# Patient Record
Sex: FEMALE | Race: WHITE | NOT HISPANIC OR LATINO | URBAN - METROPOLITAN AREA
[De-identification: names, ages, dates, MRNs, and addresses within clinical notes are randomized per-mention and may not be internally consistent; named-entity substitution may affect disease eponyms.]

---

## 2017-01-02 ENCOUNTER — HOSPITAL ENCOUNTER (OUTPATIENT)
Dept: PHYSICAL THERAPY | Facility: HOSPITAL | Age: 62
Setting detail: THERAPIES SERIES
Discharge: HOME | End: 2017-01-02

## 2017-01-02 DIAGNOSIS — Z47.89 ORTHOPEDIC AFTERCARE: Primary | ICD-10-CM

## 2017-01-02 DIAGNOSIS — M25.561 ACUTE PAIN OF RIGHT KNEE: ICD-10-CM

## 2017-01-02 DIAGNOSIS — Z96.651 HISTORY OF TOTAL KNEE REPLACEMENT, RIGHT: ICD-10-CM

## 2017-01-02 PROCEDURE — 97161 PT EVAL LOW COMPLEX 20 MIN: CPT

## 2017-01-02 PROCEDURE — 97110 THERAPEUTIC EXERCISES: CPT

## 2017-01-02 PROCEDURE — G8979 MOBILITY GOAL STATUS: HCPCS

## 2017-01-02 PROCEDURE — G8978 MOBILITY CURRENT STATUS: HCPCS

## 2017-01-02 NOTE — PROGRESS NOTES
Outpatient Physical Therapy Ortho Initial Evaluation  [facility]     Patient Name: Lisette Larose  : 1955  MRN: [id number]  Today's Date: 2017      Visit Date: 2017    There is no problem list on file for this patient.       History reviewed. No pertinent past medical history.     History reviewed. No pertinent past surgical history.    Visit Dx:     ICD-10-CM ICD-9-CM   1. Orthopedic aftercare Z47.89 V54.9   2. History of total knee replacement, right Z96.651 V43.65   3. Acute pain of right knee M25.561 719.46             Patient History       17 0900          History    Chief Complaint Pain  -LS      Type of Pain Knee pain  -LS      Date Current Problem(s) Began 16  -LS      Brief Description of Current Complaint Pt is s/p TKR 16. She is currently living with her daughter in [place] who has 2 small steps to enter home for next 4 weeks during therapy. She reports long hx of OA and B knee pain R > L. She also has hx of L RTC repair 2 years ago. She reports aching pain superior to medial femoral condyle and achiness throughout R shin and femur increased with ambulation or standing.   -LS      Previous treatment for THIS PROBLEM Surgery  -LS      Onset Date- PT 17  -LS      Surgery Date: 16  -LS      Patient/Caregiver Goals Relieve pain;Return to prior level of function;Improve mobility;Improve strength  -LS      Occupation/sports/leisure activities Pt does not work, enjoys shopping, walking, going out with friends.  -LS      Patient seeing anyone else for problem(s)? Yes  -LS      How has patient tried to help current problem? rest, exercise, medication  -LS      Surgery Date 1 16  -LS      Pain     Pain Location Knee  -LS      Pain at Present 4  -LS      Pain at Best 2  -LS      Pain at Worst 9  -LS      Pain Frequency Constant/continuous  -LS      Pain Description Aching  -LS      What Performance Factors Make the Current Problem(s) WORSE? standing, walking   -LS      What Performance Factors Make the Current Problem(s) BETTER? rest, ice, medication  -LS      Pain Comments I have pain mostly at my medial knee and it goes into my shin and up into my leg.  -LS      Tolerance Time- Standing 15 minutes  -LS      Tolerance Time- Sitting no issue  -LS      Tolerance Time- Walking 15 minutes  -LS      Tolerance Time- Lying disturbed, has to get up and move around  -LS      Is your sleep disturbed? Yes  -LS      What position do you sleep in? Left sidelying  -LS      Difficulties at work? pt does not work  -LS      Difficulties with ADL's? yes, unable to stand and walk for long periods of time to clean/cook  -LS      Difficulties with recreational activities? yes, pt has difficulty ambulating within mall or grocery store.  -LS      Fall Risk Assessment    Any falls in the past year: No  -LS      Services    Prior Rehab/Home Health Experiences Yes  -LS      When was the prior experience with Rehab/Home Health December 2016  -LS      Where was the prior experience with Rehab/Home Health [facility]  -LS      Are you currently receiving Home Health services No  -LS      Daily Activities    Primary Language English  -LS      Pt Participated in POC and Goals Yes  -LS      Safety    Are you being hurt, hit, or frightened by anyone at home or in your life? No  -LS      Are you being neglected by a caregiver No  -LS        User Key  (r) = Recorded By, (t) = Taken By, (c) = Cosigned By    Initials Name Provider Type    JOSELYN Taylor, PT Physical Therapist                PT Ortho       01/02/17 1300    Subjective Comments    Subjective Comments I have pain mostly on the inside of my knee, but that was there prior to sx.  -LS    Subjective Pain    Able to rate subjective pain? yes  -LS    Pre-Treatment Pain Level 4  -LS    Post-Treatment Pain Level 4  -LS    Subjective Pain Comment It feels more loose after I exercies.  -LS    Posture/Observations    Posture/Observations Comments Noted  R knee incision, closed, well- healing, no evidence of infection; minimally elevated at proximal 1-2 in. of incision, minimal scar tissue noted; forward head, rounded shoulders, decreased lumbar lordosis  -LS    Sensation    Additional Comments Pt reports numbness lateral to proximal 50% of incision. Hypersensitivity along incision.  -LS    Myotomal Screen- Lower Quarter Clearing    Hip flexion (L2) Left:;4+ (Good +);Right:;4- (Good -)  -LS    Knee extension (L3) Left:;5 (Normal);Right:;4 (Good)  -LS    Ankle DF (L4) Bilateral:;5 (Normal)  -LS    Ankle PF (S1) Bilateral:;5 (Normal)  -LS    Knee flexion (S2) Left:;5 (Normal);Right:;4+ (Good +)  -LS    Left Knee    Extension/Flexion AROM Deficit 0-120 degrees in supine  -LS    Right Knee    Extension/Flexion AROM Deficit 5-92 degrees in supine  -LS    Extension/Flexion PROM Deficit 5-95 degrees in supine  -LS    MMT (Manual Muscle Testing)    General MMT Assessment Detail sidelying abduction 4/5 B  -LS    Lower Extremity Flexibility    Hamstrings Bilateral:;Moderately limited  -LS    Quadriceps Bilateral:;Moderately limited  -LS    Hip External Rotators Bilateral:;Mildly limited  -LS    Hip Internal Rotators Bilateral:;Mildly limited  -LS    Gastrocnemius Bilateral:;Moderately limited  -LS    Gait Assessment/Treatment    Gait, [place] Level conditional independence  -LS    Gait, Assistive Device straight cane  -LS    Gait, Distance (Feet) 200  -LS    Gait, Gait Deviations antalgic;right:;annalise decreased;decreased heel strike;step length decreased;weight-shifting ability decreased  -LS    Gait, Comment decreased TKE RLE  -LS    Stairs Assessment/Treatment    Number of Stairs 4  -LS    Stairs, Handrail Location right side (ascending)  -LS    Stairs, [place] Level conditional independence  -LS    Stairs, Assistive Device straight cane  -LS    Stairs, Technique Used step to step (ascending);step to step (descending)  -LS      User Key  (r) = Recorded By, (t) =  Taken By, (c) = Cosigned By    Initials Name Provider Type    JOSELYN Taylor PT Physical Therapist                            Therapy Education       01/02/17 1338    Therapy Education    Given HEP;Symptoms/condition management;Pain management  -LS    Program New  -LS    How Provided Verbal;Demonstration;Written  -LS    Provided to Patient  -LS    Level of Understanding Teach back education performed;Verbalized;Demonstrated  -LS      User Key  (r) = Recorded By, (t) = Taken By, (c) = Cosigned By    Initials Name Provider Type    JOSELYN Taylor PT Physical Therapist                PT OP Goals       01/02/17 1300          PT Short Term Goals    STG Date to Achieve 01/16/17  -LS      STG 1 Pt will demonstrate understanding and compliance with initial HEP.  -LS      STG 1 Progress New  -LS      STG 2 Pt will demonstrate improved RLE AROM from 5-92 degrees to 3-100 degrees to improve her gait pattern and mechanics.  -LS      STG 2 Progress New  -LS      STG 3 Pt will demonstrate equal step length and equal heel strike B with least restrictive AD to restore her normal gait mechanics.   -LS      STG 3 Progress New  -LS      Long Term Goals    LTG Date to Achieve 02/01/17  -LS      LTG 1 Pt will demonstrate improved RLE AROM from 5-92 degrees to 0-120 to restore normal motion.  -LS      LTG 1 Progress New  -LS      LTG 2 Pt will demonstrate 5/5 knee flexion and extension strength to allow her to return to her prior level of function.  -LS      LTG 2 Progress New  -LS      LTG 3 Pt will demonstrate improved overall function as evidenced by reduced disability score on KOS from 56% to less than or equal to 35% to improve her transition back to independent living in [place], [place].  -LS      LTG 3 Progress New  -LS      Time Calculation    PT Goal Re-Cert Due Date 02/01/17  -LS        User Key  (r) = Recorded By, (t) = Taken By, (c) = Cosigned By    Initials Name Provider Type    JOSELYN Taylor PT Physical Therapist                 PT Assessment/Plan       01/02/17 1340          PT Assessment    Functional Limitations Decreased safety during functional activities;Limitations in community activities;Performance in sport activities;Impaired gait;Limitations in functional capacity and performance;Impaired locomotion;Performance in leisure activities;Limitation in home management;Performance in self-care ADL  -LS      Impairments Balance;Gait;Impaired flexibility;Muscle strength;Sensation;Pain;Integumentary integrity;Joint integrity;Poor body mechanics;Joint mobility;Impaired muscle endurance;Edema;Impaired muscle length;Posture;Locomotion;Range of motion;Motor function;Impaired muscle power;Endurance  -LS      Assessment Comments Pt is 61 year old female s/p R TKR 11/28/16. She lives in [place], [place] and had 2-3 weeks of  PT there before coming to stay with her daughter in [place] for the next 4 weeks. She ambulates using a SC  which she has now been using for only 2 days. Her R knee AROM is 5-92 degrees. Her R knee strength is 4/5 flexion and extension, R hip flexion 4-/5. Her R ankle strength is 5/5. LLE strength is 5/5 grossly and L knee ROM 0-128 degrees. She reports pain described as achiness R knee superior to medial femoral condyle as well as into shin and along distal femur that typically remains 4/10. Her hamstrings and gastroc muscles are moderately tight B and her hip abductors demonstrate 4/5 strength in sidelying. Her ncision appears well healed and fully closed. She demonstrates minimal raised scar at proximal edge with minimal scar tissues along incision. She reports numbness lateral to proximal 50% of incision but otherwise RLE sensation is WFL. She demonstrates antalgic gait pattern with decreased heel strike and TKE RLE as well as decreased weight shift to the R and decreased L step length. Pt will benefit from continued skilled PT services in order to improve her ROM and strength, improve her gait mechanics, and  restore her to her prior level of function.   -LS      Please refer to paper survey for additional self-reported information Yes  -LS      Rehab Potential Good  -LS      Patient/caregiver participated in establishment of treatment plan and goals Yes  -LS      Patient would benefit from skilled therapy intervention Yes  -LS      PT Plan    PT Frequency 3x/week  -LS      Predicted Duration of Therapy Intervention (days/wks) 4 weeks   -LS      Planned CPT's? PT EVAL: 89323;PT MANUAL THERAPY EA 15 MIN: 54415;PT SELF CARE/HOME MGMT/TRAIN EA 15: 00392;PT NEUROMUSC RE-EDUCATION EA 15 MIN: 69732;PT RE-EVAL: 32855;PT THER PROC EA 15 MIN: 08899;PT GAIT TRAINING EA 15 MIN: 91566;PT HOT OR COLD PACK TREAT [facility];PT THER ACT EA 15 MIN: 61523  -LS      PT Plan Comments Initiate R TKR rehabilitation program.   -LS        User Key  (r) = Recorded By, (t) = Taken By, (c) = Cosigned By    Initials Name Provider Type    JOSELYN Taylor PT Physical Therapist                  Exercises       01/02/17 1300          Subjective Comments    Subjective Comments I have pain mostly on the inside of my knee, but that was there prior to sx.  -LS      Subjective Pain    Able to rate subjective pain? yes  -LS      Pre-Treatment Pain Level 4  -LS      Post-Treatment Pain Level 4  -LS      Subjective Pain Comment It feels more loose after I exercies.  -LS      Exercise 1    Exercise Name 1 see HEP  -LS        User Key  (r) = Recorded By, (t) = Taken By, (c) = Cosigned By    Initials Name Provider Type    JOSELYN Taylor PT Physical Therapist                              Outcome Measures       01/02/17 1300          Knee Outcome Score    Knee Outcome Score Comments 56% disability  -LS      Functional Assessment    Outcome Measure Options Knee Outcome Score- ADL  -LS        User Key  (r) = Recorded By, (t) = Taken By, (c) = Cosigned By    Initials Name Provider Type    JOSELYN Taylor PT Physical Therapist            Time Calculation:   Start  Time: 0930  Stop Time: 1030  Time Calculation (min): 60 min     Therapy Charges for Today     Code Description Service Date Service Provider Modifiers Qty    60083745657 HC PT MOBILITY CURRENT 1/2/2017 Abby Taylor, PT GP, CK 1    50071583288 HC PT MOBILITY PROJECTED 1/2/2017 Abby Taylor, PT GP, CJ 1    41006258898 HC PT EVAL LOW COMPLEXITY 3 1/2/2017 Abby Taylor, PT GP 1    91483590698 HC PT THER PROC EA 15 MIN 1/2/2017 Abby Taylor, PT GP 1          PT G-Codes  PT Professional Judgement Used?: Yes  Outcome Measure Options: Knee Outcome Score- ADL  Score: 56% disability  Functional Limitation: Mobility: Walking and moving around  Mobility: Walking and Moving Around Current Status (): At least 40 percent but less than 60 percent impaired, limited or restricted  Mobility: Walking and Moving Around Goal Status (): At least 20 percent but less than 40 percent impaired, limited or restricted         Abby Taylor, PT  1/2/2017

## 2017-01-06 ENCOUNTER — APPOINTMENT (OUTPATIENT)
Dept: PHYSICAL THERAPY | Facility: HOSPITAL | Age: 62
End: 2017-01-06

## 2017-01-09 ENCOUNTER — HOSPITAL ENCOUNTER (OUTPATIENT)
Dept: PHYSICAL THERAPY | Facility: HOSPITAL | Age: 62
Setting detail: THERAPIES SERIES
Discharge: HOME | End: 2017-01-09

## 2017-01-09 DIAGNOSIS — Z96.651 HISTORY OF TOTAL KNEE REPLACEMENT, RIGHT: ICD-10-CM

## 2017-01-09 DIAGNOSIS — Z47.89 ORTHOPEDIC AFTERCARE: Primary | ICD-10-CM

## 2017-01-09 PROCEDURE — 97110 THERAPEUTIC EXERCISES: CPT

## 2017-01-09 NOTE — PROGRESS NOTES
Outpatient Physical Therapy Ortho Treatment Note  [facility]     Patient Name: Lisette Larose  : 1955  MRN: [id number]  Today's Date: 2017      Visit Date: 2017    Visit Dx:    ICD-10-CM ICD-9-CM   1. Orthopedic aftercare Z47.89 V54.9   2. History of total knee replacement, right Z96.651 V43.65       There is no problem list on file for this patient.       No past medical history on file.     No past surgical history on file.                          PT Assessment/Plan       17 1756          PT Assessment    Assessment Comments Pt tolerated exercises well. Demonstrated improved gait mechanics with verbal cuing and repetition around clinic. She continues to report hypersensitivity surrounding R knee and achiness in hip, upper thigh, and calf. Added several exercises today which pt tolerated well.   -LS      PT Plan    PT Plan Comments Continue R TKR strengthening and ROM program with gait training incorporated.   -LS        User Key  (r) = Recorded By, (t) = Taken By, (c) = Cosigned By    Initials Name Provider Type    JOSELYN Taylor PT Physical Therapist                Modalities       17 1700          Ice    Ice Applied Yes  -LS      Location R knee over bolster  -LS      Rx Minutes 10 mins  -LS      Ice S/P Rx Yes  -LS        User Key  (r) = Recorded By, (t) = Taken By, (c) = Cosigned By    Initials Name Provider Type    JOSELYN Taylor PT Physical Therapist                Exercises       17 1700          Subjective Comments    Subjective Comments I have that aching pain on the inside but otherwise I am doing well.   -LS      Subjective Pain    Able to rate subjective pain? yes  -LS      Pre-Treatment Pain Level 3  -LS      Post-Treatment Pain Level 2  -LS      Subjective Pain Comment It still gets aggravated when clothing touches it.   -LS      Exercise 1    Exercise Name 1 see flowsheet                                                                                                                                                                                                                                                                                see flowsheet  -LS        User Key  (r) = Recorded By, (t) = Taken By, (c) = Cosigned By    Initials Name Provider Type    JOSELYN Taylor PT Physical Therapist                               PT OP Goals       01/02/17 1300          PT Short Term Goals    STG Date to Achieve 01/16/17  -LS      STG 1 Pt will demonstrate understanding and compliance with initial HEP.  -LS      STG 1 Progress New  -LS      STG 2 Pt will demonstrate improved RLE AROM from 5-92 degrees to 3-100 degrees to improve her gait pattern and mechanics.  -LS      STG 2 Progress New  -LS      STG 3 Pt will demonstrate equal step length and equal heel strike B with least restrictive AD to restore her normal gait mechanics.   -LS      STG 3 Progress New  -LS      Long Term Goals    LTG Date to Achieve 02/01/17  -LS      LTG 1 Pt will demonstrate improved RLE AROM from 5-92 degrees to 0-120 to restore normal motion.  -LS      LTG 1 Progress New  -LS      LTG 2 Pt will demonstrate 5/5 knee flexion and extension strength to allow her to return to her prior level of function.  -LS      LTG 2 Progress New  -LS      LTG 3 Pt will demonstrate improved overall function as evidenced by reduced disability score on KOS from 56% to less than or equal to 35% to improve her transition back to independent living in [place], [place].  -      LTG 3 Progress New  -      Time Calculation    PT Goal Re-Cert Due Date 02/01/17  -        User Key  (r) = Recorded By, (t) = Taken By, (c) = Cosigned By    Initials Name Provider Type    JOSELYN Taylor PT Physical Therapist                    Time Calculation:   Start Time: 1530  Stop Time: 1615  Time Calculation (min): 45 min    Therapy Charges for Today     Code Description Service Date Service Provider Modifiers Qty    29512502786  PT  THER PROC EA 15 MIN 1/9/2017 Abby Taylor, PT GP 3    [id number]  PT HOT OR COLD PACK TREAT [facility] 1/9/2017 Abby Taylor, PT GP 1                    Abby Taylor, PT  1/9/2017

## 2017-01-11 ENCOUNTER — HOSPITAL ENCOUNTER (OUTPATIENT)
Dept: PHYSICAL THERAPY | Facility: HOSPITAL | Age: 62
Setting detail: THERAPIES SERIES
Discharge: HOME | End: 2017-01-11

## 2017-01-11 DIAGNOSIS — Z96.651 HISTORY OF TOTAL KNEE REPLACEMENT, RIGHT: ICD-10-CM

## 2017-01-11 DIAGNOSIS — Z47.89 ORTHOPEDIC AFTERCARE: Primary | ICD-10-CM

## 2017-01-11 DIAGNOSIS — M25.561 ACUTE PAIN OF RIGHT KNEE: ICD-10-CM

## 2017-01-11 PROCEDURE — 97110 THERAPEUTIC EXERCISES: CPT

## 2017-01-11 NOTE — PROGRESS NOTES
Outpatient Physical Therapy Ortho Treatment Note  [facility]     Patient Name: Lisette Larose  : 1955  MRN: [id number]  Today's Date: 2017      Visit Date: 2017    Visit Dx:    ICD-10-CM ICD-9-CM   1. Orthopedic aftercare Z47.89 V54.9   2. History of total knee replacement, right Z96.651 V43.65   3. Acute pain of right knee M25.561 719.46       There is no problem list on file for this patient.       No past medical history on file.     No past surgical history on file.                          PT Assessment/Plan       17 1525 17 1756       PT Assessment    Assessment Comments Pt continues to do well with strengthening and ROM exercises. Added RLE step ups to 4 in. step today and pt tolerated well. Gait mechanics are improving with improved knee flexion in swing today.   -LS Pt tolerated exercises well. Demonstrated improved gait mechanics with verbal cuing and repetition around clinic. She continues to report hypersensitivity surrounding R knee and achiness in hip, upper thigh, and calf. Added several exercises today which pt tolerated well.   -LS     PT Plan    PT Plan Comments Continue R TKR strengthening and ROM program.  -LS Continue R TKR strengthening and ROM program with gait training incorporated.   -LS       User Key  (r) = Recorded By, (t) = Taken By, (c) = Cosigned By    Initials Name Provider Type    JOSELYN Taylor, PT Physical Therapist                Modalities       17 1500          Ice    Location R knee over bolster  -LS      Rx Minutes 10 mins  -LS      Ice S/P Rx Yes  -        User Key  (r) = Recorded By, (t) = Taken By, (c) = Cosigned By    Initials Name Provider Type    JOSELYN Taylor, PT Physical Therapist                Exercises       17 1500          Subjective Comments    Subjective Comments MD in [place] was pleased with my progress.  -LS      Subjective Pain    Able to rate subjective pain? yes  -LS      Pre-Treatment Pain Level 3  -       Post-Treatment Pain Level 3  -LS      Subjective Pain Comment I have occcasional increase in pain but typically it stays a 3/10.  -LS      Exercise 1    Exercise Name 1 see flowsheet  -LS        User Key  (r) = Recorded By, (t) = Taken By, (c) = Cosigned By    Initials Name Provider Type    JOSELYN Taylor PT Physical Therapist                        Manual Rx (last 36 hours)      Manual Treatments       01/11/17 1500          Manual Rx 1    Manual Rx 1 Location R knee PROM flexion  -LS      Manual Rx 1 Duration 5 minutes  -LS        User Key  (r) = Recorded By, (t) = Taken By, (c) = Cosigned By    Initials Name Provider Type    LS Abby Taylor, PT Physical Therapist                PT OP Goals       01/02/17 1300          PT Short Term Goals    STG Date to Achieve 01/16/17  -LS      STG 1 Pt will demonstrate understanding and compliance with initial HEP.  -LS      STG 1 Progress New  -LS      STG 2 Pt will demonstrate improved RLE AROM from 5-92 degrees to 3-100 degrees to improve her gait pattern and mechanics.  -LS      STG 2 Progress New  -LS      STG 3 Pt will demonstrate equal step length and equal heel strike B with least restrictive AD to restore her normal gait mechanics.   -LS      STG 3 Progress New  -LS      Long Term Goals    LTG Date to Achieve 02/01/17  -LS      LTG 1 Pt will demonstrate improved RLE AROM from 5-92 degrees to 0-120 to restore normal motion.  -LS      LTG 1 Progress New  -LS      LTG 2 Pt will demonstrate 5/5 knee flexion and extension strength to allow her to return to her prior level of function.  -LS      LTG 2 Progress New  -LS      LTG 3 Pt will demonstrate improved overall function as evidenced by reduced disability score on KOS from 56% to less than or equal to 35% to improve her transition back to independent living in [place], [place].  -LS      LTG 3 Progress New  -LS      Time Calculation    PT Goal Re-Cert Due Date 02/01/17  -LS        User Key  (r) = Recorded By, (t) =  Taken By, (c) = Cosigned By    Initials Name Provider Type    LS Abby Taylor, PT Physical Therapist                    Time Calculation:   Start Time: 1445  Stop Time: 1535  Time Calculation (min): 50 min    Therapy Charges for Today     Code Description Service Date Service Provider Modifiers Qty    20722360575 HC PT THER PROC EA 15 MIN 1/11/2017 Abby Taylor, PT GP 3    [id number] HC PT HOT OR COLD PACK TREAT [facility] 1/11/2017 Abby Taylor, PT GP 1                    Abby Taylor PT  1/11/2017

## 2017-01-13 ENCOUNTER — HOSPITAL ENCOUNTER (OUTPATIENT)
Dept: PHYSICAL THERAPY | Facility: HOSPITAL | Age: 62
Setting detail: THERAPIES SERIES
Discharge: HOME | End: 2017-01-13

## 2017-01-13 DIAGNOSIS — M25.561 ACUTE PAIN OF RIGHT KNEE: ICD-10-CM

## 2017-01-13 DIAGNOSIS — Z96.651 HISTORY OF TOTAL KNEE REPLACEMENT, RIGHT: ICD-10-CM

## 2017-01-13 DIAGNOSIS — Z47.89 ORTHOPEDIC AFTERCARE: Primary | ICD-10-CM

## 2017-01-13 PROCEDURE — 97110 THERAPEUTIC EXERCISES: CPT

## 2017-01-13 NOTE — PROGRESS NOTES
Outpatient Physical Therapy Ortho Treatment Note  [facility]     Patient Name: Lisette Larose  : 1955  MRN: [id number]  Today's Date: 2017      Visit Date: 2017    Visit Dx:    ICD-10-CM ICD-9-CM   1. Orthopedic aftercare Z47.89 V54.9   2. History of total knee replacement, right Z96.651 V43.65   3. Acute pain of right knee M25.561 719.46       There is no problem list on file for this patient.       No past medical history on file.     No past surgical history on file.                          PT Assessment/Plan       17 1514 17 1525 17 1756    PT Assessment    Assessment Comments Patient has good knee flexion passivly 110. Progressing with strengthening.   -WS Pt continues to do well with strengthening and ROM exercises. Added RLE step ups to 4 in. step today and pt tolerated well. Gait mechanics are improving with improved knee flexion in swing today.   -LS Pt tolerated exercises well. Demonstrated improved gait mechanics with verbal cuing and repetition around clinic. She continues to report hypersensitivity surrounding R knee and achiness in hip, upper thigh, and calf. Added several exercises today which pt tolerated well.   -LS    PT Plan    PT Plan Comments  Continue R TKR strengthening and ROM program.  -LS Continue R TKR strengthening and ROM program with gait training incorporated.   -LS      User Key  (r) = Recorded By, (t) = Taken By, (c) = Cosigned By    Initials Name Provider Type    CRISTY Martinez PTA Physical Therapy Assistant    LS Abby Taylor, PT Physical Therapist                Modalities       17 1500          Ice    Ice Applied Yes  -WS      Location R knee over bolster  -WS      Rx Minutes 10 mins  -WS      Ice S/P Rx Yes  -WS        User Key  (r) = Recorded By, (t) = Taken By, (c) = Cosigned By    Initials Name Provider Type    CRISTY Martinez PTA Physical Therapy Assistant                Exercises       17 4112           Subjective Comments    Subjective Comments Knee pain is low  -      Subjective Pain    Able to rate subjective pain? yes  -WS      Pre-Treatment Pain Level 3  -WS      Exercise 1    Exercise Name 1 see flowsheet  -        User Key  (r) = Recorded By, (t) = Taken By, (c) = Cosigned By    Initials Name Provider Type    CRISTY Martinez PTA Physical Therapy Assistant                               PT OP Goals       01/13/17 1500 01/02/17 1300       PT Short Term Goals    STG Date to Achieve 01/16/17  -WS 01/16/17  -LS     STG 1 Pt will demonstrate understanding and compliance with initial HEP.  -WS Pt will demonstrate understanding and compliance with initial HEP.  -LS     STG 1 Progress Met  -WS New  -LS     STG 2 Pt will demonstrate improved RLE AROM from 5-92 degrees to 3-100 degrees to improve her gait pattern and mechanics.  -WS Pt will demonstrate improved RLE AROM from 5-92 degrees to 3-100 degrees to improve her gait pattern and mechanics.  -LS     STG 2 Progress Partially Met  -WS New  -LS     STG 3 Pt will demonstrate equal step length and equal heel strike B with least restrictive AD to restore her normal gait mechanics.   -WS Pt will demonstrate equal step length and equal heel strike B with least restrictive AD to restore her normal gait mechanics.   -LS     STG 3 Progress Ongoing  -WS New  -LS     Long Term Goals    LTG Date to Achieve 02/01/17  -WS 02/01/17  -LS     LTG 1 Pt will demonstrate improved RLE AROM from 5-92 degrees to 0-120 to restore normal motion.  -WS Pt will demonstrate improved RLE AROM from 5-92 degrees to 0-120 to restore normal motion.  -LS     LTG 1 Progress Ongoing  -WS New  -LS     LTG 2 Pt will demonstrate 5/5 knee flexion and extension strength to allow her to return to her prior level of function.  -WS Pt will demonstrate 5/5 knee flexion and extension strength to allow her to return to her prior level of function.  -LS     LTG 2 Progress Ongoing  -WS New  -LS     LTG 3  Pt will demonstrate improved overall function as evidenced by reduced disability score on KOS from 56% to less than or equal to 35% to improve her transition back to independent living in [place], [place].  -WS Pt will demonstrate improved overall function as evidenced by reduced disability score on KOS from 56% to less than or equal to 35% to improve her transition back to independent living in [place], [place].  -LS     LTG 3 Progress Ongoing  -WS New  -LS     Time Calculation    PT Goal Re-Cert Due Date  02/01/17  -LS       User Key  (r) = Recorded By, (t) = Taken By, (c) = Cosigned By    Initials Name Provider Type    CRISTY Martinez PTA Physical Therapy Assistant    JOSELYN Taylor, PT Physical Therapist                Therapy Education       01/13/17 1512    Therapy Education    Given HEP;Symptoms/condition management;Pain management;Mobility training  -WS    Program Reinforced  -WS    How Provided Verbal  -WS    Provided to Patient  -WS      User Key  (r) = Recorded By, (t) = Taken By, (c) = Cosigned By    Initials Name Provider Type    CRISTY Martinez PTA Physical Therapy Assistant                Time Calculation:   Start Time: 1435  Stop Time: 1530  Time Calculation (min): 55 min    Therapy Charges for Today     Code Description Service Date Service Provider Modifiers Qty    21653063961 HC PT THER PROC EA 15 MIN 1/13/2017 Murtaza Martinez PTA GP 3    04399406661 HC PT HOT OR COLD PACK TREAT [facility] 1/13/2017 Murtaza Martinez PTA GP 1                    Murtaza Martinez PTA  1/13/2017

## 2017-01-16 ENCOUNTER — HOSPITAL ENCOUNTER (OUTPATIENT)
Dept: PHYSICAL THERAPY | Facility: HOSPITAL | Age: 62
Setting detail: THERAPIES SERIES
Discharge: HOME | End: 2017-01-16

## 2017-01-16 DIAGNOSIS — Z47.89 ORTHOPEDIC AFTERCARE: Primary | ICD-10-CM

## 2017-01-16 DIAGNOSIS — M25.561 ACUTE PAIN OF RIGHT KNEE: ICD-10-CM

## 2017-01-16 DIAGNOSIS — Z96.651 HISTORY OF TOTAL KNEE REPLACEMENT, RIGHT: ICD-10-CM

## 2017-01-16 PROCEDURE — 97110 THERAPEUTIC EXERCISES: CPT

## 2017-01-16 NOTE — PROGRESS NOTES
Outpatient Physical Therapy Ortho Treatment Note  [facility]     Patient Name: Lisette Larose  : 1955  MRN: [id number]  Today's Date: 2017      Visit Date: 2017    Visit Dx:    ICD-10-CM ICD-9-CM   1. Orthopedic aftercare Z47.89 V54.9   2. History of total knee replacement, right Z96.651 V43.65   3. Acute pain of right knee M25.561 719.46       There is no problem list on file for this patient.       No past medical history on file.     No past surgical history on file.                          PT Assessment/Plan       17 1520 17 1514 17 1525    PT Assessment    Assessment Comments Pt continues to do well with strengthening and ROM activities. Advanced to eccentric quadriceps activity today with LLE step downs from 4 in. step. Pt was slightly apprehensive but did well. Required cuing to reduce circumduction compensation when performing foward step up.  - Patient has good knee flexion passivly 110. Progressing with strengthening.   - Pt continues to do well with strengthening and ROM exercises. Added RLE step ups to 4 in. step today and pt tolerated well. Gait mechanics are improving with improved knee flexion in swing today.   -    PT Plan    PT Plan Comments Continue R TKR strengthening and ROM program.   -  Continue R TKR strengthening and ROM program.  -      17 1756          PT Assessment    Assessment Comments Pt tolerated exercises well. Demonstrated improved gait mechanics with verbal cuing and repetition around clinic. She continues to report hypersensitivity surrounding R knee and achiness in hip, upper thigh, and calf. Added several exercises today which pt tolerated well.   -      PT Plan    PT Plan Comments Continue R TKR strengthening and ROM program with gait training incorporated.   -        User Key  (r) = Recorded By, (t) = Taken By, (c) = Cosigned By    Initials Name Provider Type    CRISTY Martinez PTA Physical Therapy Assistant     JOSELYN Taylor, PT Physical Therapist                    Exercises       01/16/17 1500          Subjective Comments    Subjective Comments I have had increased pain and swelling this weekend. I have been driving and I think that may be why.  -LS      Subjective Pain    Able to rate subjective pain? yes  -LS      Pre-Treatment Pain Level 3  -LS      Post-Treatment Pain Level 3  -LS      Exercise 1    Exercise Name 1 see flowsheet  -LS        User Key  (r) = Recorded By, (t) = Taken By, (c) = Cosigned By    Initials Name Provider Type    JOSELYN Taylor, PT Physical Therapist                               PT OP Goals       01/13/17 1500          PT Short Term Goals    STG Date to Achieve 01/16/17  -      STG 1 Pt will demonstrate understanding and compliance with initial HEP.  -      STG 1 Progress Met  -      STG 2 Pt will demonstrate improved RLE AROM from 5-92 degrees to 3-100 degrees to improve her gait pattern and mechanics.  -      STG 2 Progress Partially Met  -      STG 3 Pt will demonstrate equal step length and equal heel strike B with least restrictive AD to restore her normal gait mechanics.   -      STG 3 Progress Ongoing  -      Long Term Goals    LTG Date to Achieve 02/01/17  -      LTG 1 Pt will demonstrate improved RLE AROM from 5-92 degrees to 0-120 to restore normal motion.  -      LTG 1 Progress Ongoing  -      LTG 2 Pt will demonstrate 5/5 knee flexion and extension strength to allow her to return to her prior level of function.  -      LTG 2 Progress Ongoing  -      LTG 3 Pt will demonstrate improved overall function as evidenced by reduced disability score on KOS from 56% to less than or equal to 35% to improve her transition back to independent living in [place], [place].  -      LTG 3 Progress Ongoing  -        User Key  (r) = Recorded By, (t) = Taken By, (c) = Cosigned By    Initials Name Provider Type    CRISTY Martinez PTA Physical Therapy Assistant                 Therapy Education       01/13/17 1512    Therapy Education    Given HEP;Symptoms/condition management;Pain management;Mobility training  -WS    Program Reinforced  -WS    How Provided Verbal  -WS    Provided to Patient  -WS      User Key  (r) = Recorded By, (t) = Taken By, (c) = Cosigned By    Initials Name Provider Type    CRISTY Martinez PTA Physical Therapy Assistant                Time Calculation:   Start Time: 1400  Stop Time: 1450  Time Calculation (min): 50 min    Therapy Charges for Today     Code Description Service Date Service Provider Modifiers Qty    13135764730 HC PT THER PROC EA 15 MIN 1/16/2017 Abby Taylor, PT GP 3    74196276229 HC PT HOT OR COLD PACK TREAT [facility] 1/16/2017 Abby Taylor, PT GP 1                    Abby Taylor, PT  1/16/2017

## 2017-01-18 ENCOUNTER — HOSPITAL ENCOUNTER (OUTPATIENT)
Dept: PHYSICAL THERAPY | Facility: HOSPITAL | Age: 62
Setting detail: THERAPIES SERIES
Discharge: HOME | End: 2017-01-18

## 2017-01-18 DIAGNOSIS — M25.561 ACUTE PAIN OF RIGHT KNEE: ICD-10-CM

## 2017-01-18 DIAGNOSIS — Z47.89 ORTHOPEDIC AFTERCARE: Primary | ICD-10-CM

## 2017-01-18 DIAGNOSIS — Z96.651 HISTORY OF TOTAL KNEE REPLACEMENT, RIGHT: ICD-10-CM

## 2017-01-18 PROCEDURE — 97110 THERAPEUTIC EXERCISES: CPT

## 2017-01-18 NOTE — PROGRESS NOTES
Outpatient Physical Therapy Ortho Treatment Note  [facility]     Patient Name: Lisette Larose  : 1955  MRN: [id number]  Today's Date: 2017      Visit Date: 2017    Visit Dx:    ICD-10-CM ICD-9-CM   1. Orthopedic aftercare Z47.89 V54.9   2. History of total knee replacement, right Z96.651 V43.65   3. Acute pain of right knee M25.561 719.46       There is no problem list on file for this patient.       No past medical history on file.     No past surgical history on file.                          PT Assessment/Plan       17 1548 17 1520 17 1514    PT Assessment    Assessment Comments Pt demonstrating R knee PROM to 116 with minimal resistance today. She is improving ambulation with SC but continues to require VC to improve TKE and heel strike RLE. Confidence and muscle activation ascending/descending stairs has improved but pt remains slightly reluctant to bear full weight on RLE to ascend 6 in. step.   -LS Pt continues to do well with strengthening and ROM activities. Advanced to eccentric quadriceps activity today with LLE step downs from 4 in. step. Pt was slightly apprehensive but did well. Required cuing to reduce circumduction compensation when performing foward step up.  -LS Patient has good knee flexion passivly 110. Progressing with strengthening.   -WS    PT Plan    PT Plan Comments Continue R TKR strengthening and ROM program. Emphasize TKE, stair negotiation and gait.   -LS Continue R TKR strengthening and ROM program.   -LS       User Key  (r) = Recorded By, (t) = Taken By, (c) = Cosigned By    Initials Name Provider Type    CRISTY Martinez PTA Physical Therapy Assistant    JOSELYN Taylor, PT Physical Therapist                Modalities       17 1500          Ice    Location R knee over bolster  -LS      Rx Minutes 10 mins  -LS      Ice S/P Rx Yes  -LS        User Key  (r) = Recorded By, (t) = Taken By, (c) = Cosigned By    Initials Name Provider Type     JOSELYN Taylor, [place] Physical Therapist                Exercises       01/18/17 1500          Subjective Comments    Subjective Comments I find that it is swelling more and a little more painful.  -LS      Subjective Pain    Able to rate subjective pain? yes  -LS      Pre-Treatment Pain Level 4  -LS      Post-Treatment Pain Level 4  -LS      Subjective Pain Comment I have pain on the inside where I always have.  -LS      Exercise 1    Exercise Name 1 see flowsheet  -LS        User Key  (r) = Recorded By, (t) = Taken By, (c) = Cosigned By    Initials Name Provider Type    JOSELYN Taylor PT Physical Therapist                        Manual Rx (last 36 hours)      Manual Treatments       01/18/17 1500          Manual Rx 1    Manual Rx 1 Location R knee PROM flexion  -LS      Manual Rx 1 Duration 5 minutes  -LS        User Key  (r) = Recorded By, (t) = Taken By, (c) = Cosigned By    Initials Name Provider Type    JOSELYN Taylor PT Physical Therapist                PT OP Goals       01/18/17 1500 01/13/17 1500       PT Short Term Goals    STG Date to Achieve 01/16/17  -LS 01/16/17  -     STG 1 Pt will demonstrate understanding and compliance with initial HEP.  -LS Pt will demonstrate understanding and compliance with initial HEP.  -WS     STG 1 Progress Met  -LS Met  -WS     STG 2 Pt will demonstrate improved RLE AROM from 5-92 degrees to 3-100 degrees to improve her gait pattern and mechanics.  -LS Pt will demonstrate improved RLE AROM from 5-92 degrees to 3-100 degrees to improve her gait pattern and mechanics.  -WS     STG 2 Progress Partially Met  -LS Partially Met  -     STG 2 Progress Comments 5-116  -LS      STG 3 Pt will demonstrate equal step length and equal heel strike B with least restrictive AD to restore her normal gait mechanics.   -LS Pt will demonstrate equal step length and equal heel strike B with least restrictive AD to restore her normal gait mechanics.   -WS     STG 3 Progress Partially  Met  -LS Ongoing  -WS     STG 3 Progress Comments Pt ambulating well with straight cane; continues to demonstrate antalgic gait pattern without AD.  -LS      Long Term Goals    LTG Date to Achieve 02/01/17  -LS 02/01/17  -WS     LTG 1 Pt will demonstrate improved RLE AROM from 5-92 degrees to 0-120 to restore normal motion.  -LS Pt will demonstrate improved RLE AROM from 5-92 degrees to 0-120 to restore normal motion.  -WS     LTG 1 Progress Progressing  -LS Ongoing  -WS     LTG 2 Pt will demonstrate 5/5 knee flexion and extension strength to allow her to return to her prior level of function.  -LS Pt will demonstrate 5/5 knee flexion and extension strength to allow her to return to her prior level of function.  -WS     LTG 2 Progress Progressing  -LS Ongoing  -WS     LTG 3 Pt will demonstrate improved overall function as evidenced by reduced disability score on KOS from 56% to less than or equal to 35% to improve her transition back to independent living in [place], [place].  -LS Pt will demonstrate improved overall function as evidenced by reduced disability score on KOS from 56% to less than or equal to 35% to improve her transition back to independent living in [place], [place].  -WS     LTG 3 Progress Progressing  -LS Ongoing  -WS       User Key  (r) = Recorded By, (t) = Taken By, (c) = Cosigned By    Initials Name Provider Type    CRISTY Martinez PTA Physical Therapy Assistant    JOSELYN Taylor, [place] Physical Therapist                Therapy Education       01/13/17 1512    Therapy Education    Given HEP;Symptoms/condition management;Pain management;Mobility training  -WS    Program Reinforced  -WS    How Provided Verbal  -WS    Provided to Patient  -WS      User Key  (r) = Recorded By, (t) = Taken By, (c) = Cosigned By    Initials Name Provider Type    CRISTY Martinez PTA Physical Therapy Assistant                Time Calculation:   Start Time: 0245  Stop Time: 0345  Time Calculation (min): 60 min    Therapy  Charges for Today     Code Description Service Date Service Provider Modifiers Qty    41037090755 HC PT THER PROC EA 15 MIN 1/18/2017 Abby Taylor, PT GP 3    26030036689 HC PT HOT OR COLD PACK TREAT [facility] 1/18/2017 Abby Taylor, PT GP 1                    Abby Taylor, PT  1/18/2017

## 2017-01-20 ENCOUNTER — HOSPITAL ENCOUNTER (OUTPATIENT)
Dept: PHYSICAL THERAPY | Facility: HOSPITAL | Age: 62
Setting detail: THERAPIES SERIES
Discharge: HOME | End: 2017-01-20

## 2017-01-20 DIAGNOSIS — Z47.89 ORTHOPEDIC AFTERCARE: Primary | ICD-10-CM

## 2017-01-20 DIAGNOSIS — Z96.651 HISTORY OF TOTAL KNEE REPLACEMENT, RIGHT: ICD-10-CM

## 2017-01-20 DIAGNOSIS — M25.561 ACUTE PAIN OF RIGHT KNEE: ICD-10-CM

## 2017-01-20 PROCEDURE — 97110 THERAPEUTIC EXERCISES: CPT

## 2017-01-20 NOTE — PROGRESS NOTES
Outpatient Physical Therapy Ortho Treatment Note  [facility]     Patient Name: Lisette Larose  : 1955  MRN: [id number]  Today's Date: 2017      Visit Date: 2017    Visit Dx:    ICD-10-CM ICD-9-CM   1. Orthopedic aftercare Z47.89 V54.9   2. History of total knee replacement, right Z96.651 V43.65   3. Acute pain of right knee M25.561 719.46       There is no problem list on file for this patient.       No past medical history on file.     No past surgical history on file.                          PT Assessment/Plan       17 1515 17 1548 17 1520    PT Assessment    Assessment Comments Increased weight with LAQ/SAQ to increased quad strength. Romm doing well. Pain is well controlled  - Pt demonstrating R knee PROM to 116 with minimal resistance today. She is improving ambulation with SC but continues to require VC to improve TKE and heel strike RLE. Confidence and muscle activation ascending/descending stairs has improved but pt remains slightly reluctant to bear full weight on RLE to ascend 6 in. step.   - Pt continues to do well with strengthening and ROM activities. Advanced to eccentric quadriceps activity today with LLE step downs from 4 in. step. Pt was slightly apprehensive but did well. Required cuing to reduce circumduction compensation when performing foward step up.  -LS    PT Plan    PT Plan Comments  Continue R TKR strengthening and ROM program. Emphasize TKE, stair negotiation and gait.   -LS Continue R TKR strengthening and ROM program.   -LS      User Key  (r) = Recorded By, (t) = Taken By, (c) = Cosigned By    Initials Name Provider Type    CRISTY Martinez PTA Physical Therapy Assistant    JOSELYN Taylor, PT Physical Therapist                Modalities       17 1500          Ice    Ice Applied Yes  -      Location R knee over bolster  -WS      Rx Minutes 10 mins  -WS      Ice S/P Rx Yes  -WS        User Key  (r) = Recorded By, (t) = Taken By,  (c) = Cosigned By    Initials Name Provider Type    CRISTY Martinez PTA Physical Therapy Assistant                Exercises       01/20/17 1440          Subjective Comments    Subjective Comments Pain is not bad today  -WS      Subjective Pain    Able to rate subjective pain? yes  -WS      Pre-Treatment Pain Level 2  -WS      Exercise 1    Exercise Name 1 see flowsheet  -        User Key  (r) = Recorded By, (t) = Taken By, (c) = Cosigned By    Initials Name Provider Type    CRISTY Martinez PTA Physical Therapy Assistant                               PT OP Goals       01/20/17 1500 01/18/17 1500 01/13/17 1500    PT Short Term Goals    STG Date to Achieve 01/16/17  -WS 01/16/17  -LS 01/16/17  -WS    STG 1 Pt will demonstrate understanding and compliance with initial HEP.  -WS Pt will demonstrate understanding and compliance with initial HEP.  -LS Pt will demonstrate understanding and compliance with initial HEP.  -WS    STG 1 Progress Met  -WS Met  -LS Met  -WS    STG 2 Pt will demonstrate improved RLE AROM from 5-92 degrees to 3-100 degrees to improve her gait pattern and mechanics.  -WS Pt will demonstrate improved RLE AROM from 5-92 degrees to 3-100 degrees to improve her gait pattern and mechanics.  -LS Pt will demonstrate improved RLE AROM from 5-92 degrees to 3-100 degrees to improve her gait pattern and mechanics.  -WS    STG 2 Progress Partially Met  -WS Partially Met  -LS Partially Met  -WS    STG 2 Progress Comments  5-116  -LS     STG 3 Pt will demonstrate equal step length and equal heel strike B with least restrictive AD to restore her normal gait mechanics.   -WS Pt will demonstrate equal step length and equal heel strike B with least restrictive AD to restore her normal gait mechanics.   -LS Pt will demonstrate equal step length and equal heel strike B with least restrictive AD to restore her normal gait mechanics.   -WS    STG 3 Progress Partially Met  -WS Partially Met  -LS Ongoing  -WS     STG 3 Progress Comments  Pt ambulating well with straight cane; continues to demonstrate antalgic gait pattern without AD.  -LS     Long Term Goals    LTG Date to Achieve 02/01/17  -WS 02/01/17  -LS 02/01/17  -WS    LTG 1 Pt will demonstrate improved RLE AROM from 5-92 degrees to 0-120 to restore normal motion.  -WS Pt will demonstrate improved RLE AROM from 5-92 degrees to 0-120 to restore normal motion.  -LS Pt will demonstrate improved RLE AROM from 5-92 degrees to 0-120 to restore normal motion.  -WS    LTG 1 Progress Progressing  -WS Progressing  -LS Ongoing  -WS    LTG 2 Pt will demonstrate 5/5 knee flexion and extension strength to allow her to return to her prior level of function.  -WS Pt will demonstrate 5/5 knee flexion and extension strength to allow her to return to her prior level of function.  -LS Pt will demonstrate 5/5 knee flexion and extension strength to allow her to return to her prior level of function.  -WS    LTG 2 Progress Progressing  -WS Progressing  -LS Ongoing  -WS    LTG 3 Pt will demonstrate improved overall function as evidenced by reduced disability score on KOS from 56% to less than or equal to 35% to improve her transition back to independent living in [place].  -WS Pt will demonstrate improved overall function as evidenced by reduced disability score on KOS from 56% to less than or equal to 35% to improve her transition back to independent living in [place], [place].  -LS Pt will demonstrate improved overall function as evidenced by reduced disability score on KOS from 56% to less than or equal to 35% to improve her transition back to independent living in [place].  -WS    LTG 3 Progress Progressing  -WS Progressing  -LS Ongoing  -WS      User Key  (r) = Recorded By, (t) = Taken By, (c) = Cosigned By    Initials Name Provider Type    CRISTY Martinez PTA Physical Therapy Assistant    JOSELYN Taylor PT Physical Therapist                Therapy Education       01/20/17 5105     Therapy Education    Given HEP;Symptoms/condition management;Pain management  -WS    Program Reinforced  -WS    How Provided Verbal  -WS    Provided to Patient  -WS      User Key  (r) = Recorded By, (t) = Taken By, (c) = Cosigned By    Initials Name Provider Type    CRISTY Martinez PTA Physical Therapy Assistant                Time Calculation:   Start Time: 1440  Stop Time: 1530  Time Calculation (min): 50 min    Therapy Charges for Today     Code Description Service Date Service Provider Modifiers Qty    76677880756 HC PT THER PROC EA 15 MIN 1/20/2017 Murtaza Martinez PTA GP 3    [id number]  PT HOT OR COLD PACK TREAT [facility] 1/20/2017 Murtaza Martinez PTA GP 1                    Murtaza Martinez PTA  1/20/2017

## 2017-01-23 ENCOUNTER — HOSPITAL ENCOUNTER (OUTPATIENT)
Dept: PHYSICAL THERAPY | Facility: HOSPITAL | Age: 62
Setting detail: THERAPIES SERIES
Discharge: HOME | End: 2017-01-23

## 2017-01-23 DIAGNOSIS — Z96.651 HISTORY OF TOTAL KNEE REPLACEMENT, RIGHT: ICD-10-CM

## 2017-01-23 DIAGNOSIS — Z47.89 ORTHOPEDIC AFTERCARE: ICD-10-CM

## 2017-01-23 DIAGNOSIS — M25.561 ACUTE PAIN OF RIGHT KNEE: Primary | ICD-10-CM

## 2017-01-23 PROCEDURE — 97110 THERAPEUTIC EXERCISES: CPT

## 2017-01-23 NOTE — PROGRESS NOTES
Outpatient Physical Therapy Ortho Treatment Note  [facility]     Patient Name: Lisette Larose  : 1955  MRN: [id number]  Today's Date: 2017      Visit Date: 2017    Visit Dx:    ICD-10-CM ICD-9-CM   1. Acute pain of right knee M25.561 719.46   2. History of total knee replacement, right Z96.651 V43.65   3. Orthopedic aftercare Z47.89 V54.9       There is no problem list on file for this patient.       No past medical history on file.     No past surgical history on file.                          PT Assessment/Plan       17 1531 17 1515 17 1548    PT Assessment    Assessment Comments Pt greatest limitation is full R knee extension. She is able to improve TKE during gait with cuing but demonstrates decreased extension when observed entering the clinic. Focused on knee extension and hip abductor strengthening today with addition of heel prop and sidelying clamshells and abduction to reduce lateral sway and trendelenberg noted during ambulation. Discussed pt sleeping position and added extension prop to HEP.   -LS Increased weight with LAQ/SAQ to increased quad strength. Romm doing well. Pain is well controlled  -WS Pt demonstrating R knee PROM to 116 with minimal resistance today. She is improving ambulation with SC but continues to require VC to improve TKE and heel strike RLE. Confidence and muscle activation ascending/descending stairs has improved but pt remains slightly reluctant to bear full weight on RLE to ascend 6 in. step.   -LS    PT Plan    PT Plan Comments Continue R TKR strengthening and ROM program; continue to foucs on extension, stair negotiation, and gait.   -LS  Continue R TKR strengthening and ROM program. Emphasize TKE, stair negotiation and gait.   -LS      User Key  (r) = Recorded By, (t) = Taken By, (c) = Cosigned By    Initials Name Provider Type    CRISTY Martinez PTA Physical Therapy Assistant    JOSELYN Taylor, PT Physical Therapist                     Exercises       01/23/17 1400          Subjective Comments    Subjective Comments It gets achy at night but I continiue to do more and more one it so that is probably why.  -LS      Subjective Pain    Able to rate subjective pain? yes  -LS      Pre-Treatment Pain Level 3  -LS      Subjective Pain Comment I feel achy through my shin and on the inside of my knee.   -LS      Exercise 1    Exercise Name 1 see flowsheet  -LS        User Key  (r) = Recorded By, (t) = Taken By, (c) = Cosigned By    Initials Name Provider Type    LS Abby Taylor, PT Physical Therapist                               PT OP Goals       01/23/17 1500 01/20/17 1500 01/18/17 1500    PT Short Term Goals    STG Date to Achieve 01/16/17  -LS 01/16/17  -WS 01/16/17  -LS    STG 1 Pt will demonstrate understanding and compliance with initial HEP.  -LS Pt will demonstrate understanding and compliance with initial HEP.  -WS Pt will demonstrate understanding and compliance with initial HEP.  -LS    STG 1 Progress Met  -LS Met  -WS Met  -LS    STG 2 Pt will demonstrate improved RLE AROM from 5-92 degrees to 3-100 degrees to improve her gait pattern and mechanics.  -LS Pt will demonstrate improved RLE AROM from 5-92 degrees to 3-100 degrees to improve her gait pattern and mechanics.  -WS Pt will demonstrate improved RLE AROM from 5-92 degrees to 3-100 degrees to improve her gait pattern and mechanics.  -LS    STG 2 Progress Partially Met  -LS Partially Met  -WS Partially Met  -LS    STG 2 Progress Comments   5-116  -LS    STG 3 Pt will demonstrate equal step length and equal heel strike B with least restrictive AD to restore her normal gait mechanics.   -LS Pt will demonstrate equal step length and equal heel strike B with least restrictive AD to restore her normal gait mechanics.   -WS Pt will demonstrate equal step length and equal heel strike B with least restrictive AD to restore her normal gait mechanics.   -LS    STG 3 Progress Met   -LS Partially Met  -WS Partially Met  -LS    STG 3 Progress Comments Pt demonstrating equal step length B and heel strike using SC.  -LS  Pt ambulating well with straight cane; continues to demonstrate antalgic gait pattern without AD.  -LS    Long Term Goals    LTG Date to Achieve 02/01/17  -LS 02/01/17  -WS 02/01/17  -LS    LTG 1 Pt will demonstrate improved RLE AROM from 5-92 degrees to 0-120 to restore normal motion.  -LS Pt will demonstrate improved RLE AROM from 5-92 degrees to 0-120 to restore normal motion.  -WS Pt will demonstrate improved RLE AROM from 5-92 degrees to 0-120 to restore normal motion.  -LS    LTG 1 Progress Progressing  -LS Progressing  -WS Progressing  -LS    LTG 1 Progress Comments 5-110 degrees  -LS      LTG 2 Pt will demonstrate 5/5 knee flexion and extension strength to allow her to return to her prior level of function.  -LS Pt will demonstrate 5/5 knee flexion and extension strength to allow her to return to her prior level of function.  -WS Pt will demonstrate 5/5 knee flexion and extension strength to allow her to return to her prior level of function.  -LS    LTG 2 Progress Progressing  -LS Progressing  -WS Progressing  -LS    LTG 2 Progress Comments Pt strength improving and using 4# cuff weights for standing hamstring and quadriceps exercises.  -LS      LTG 3 Pt will demonstrate improved overall function as evidenced by reduced disability score on KOS from 56% to less than or equal to 35% to improve her transition back to independent living in [place], [place].  -LS Pt will demonstrate improved overall function as evidenced by reduced disability score on KOS from 56% to less than or equal to 35% to improve her transition back to independent living in [place], [place].  -WS Pt will demonstrate improved overall function as evidenced by reduced disability score on KOS from 56% to less than or equal to 35% to improve her transition back to independent living in [place], [place].  -LS    LTG 3 Progress  Progressing  -LS Progressing  -WS Progressing  -LS      01/13/17 1500          PT Short Term Goals    STG Date to Achieve 01/16/17  -WS      STG 1 Pt will demonstrate understanding and compliance with initial HEP.  -WS      STG 1 Progress Met  -WS      STG 2 Pt will demonstrate improved RLE AROM from 5-92 degrees to 3-100 degrees to improve her gait pattern and mechanics.  -WS      STG 2 Progress Partially Met  -WS      STG 3 Pt will demonstrate equal step length and equal heel strike B with least restrictive AD to restore her normal gait mechanics.   -WS      STG 3 Progress Ongoing  -WS      Long Term Goals    LTG Date to Achieve 02/01/17  -WS      LTG 1 Pt will demonstrate improved RLE AROM from 5-92 degrees to 0-120 to restore normal motion.  -WS      LTG 1 Progress Ongoing  -WS      LTG 2 Pt will demonstrate 5/5 knee flexion and extension strength to allow her to return to her prior level of function.  -WS      LTG 2 Progress Ongoing  -WS      LTG 3 Pt will demonstrate improved overall function as evidenced by reduced disability score on KOS from 56% to less than or equal to 35% to improve her transition back to independent living in [place], [place].  -WS      LTG 3 Progress Ongoing  -WS        User Key  (r) = Recorded By, (t) = Taken By, (c) = Cosigned By    Initials Name Provider Type    CRISTY Martinez PTA Physical Therapy Assistant    JOSELYN Taylor, PT Physical Therapist                Therapy Education       01/20/17 1515    Therapy Education    Given HEP;Symptoms/condition management;Pain management  -WS    Program Reinforced  -WS    How Provided Verbal  -WS    Provided to Patient  -WS      User Key  (r) = Recorded By, (t) = Taken By, (c) = Cosigned By    Initials Name Provider Type    CRISTY Martinez PTA Physical Therapy Assistant                Time Calculation:   Start Time: 1400  Stop Time: 1445  Time Calculation (min): 45 min    Therapy Charges for Today     Code Description Service Date  Service Provider Modifiers Qty    70135150032 HC PT THER PROC EA 15 MIN 1/23/2017 Abby Taylor, PT GP 3    37005257491 HC PT HOT OR COLD PACK TREAT MCARE 1/23/2017 Abby Taylor, PT GP 1                    Abby Taylor, PT  1/23/2017

## 2017-01-25 ENCOUNTER — HOSPITAL ENCOUNTER (OUTPATIENT)
Dept: PHYSICAL THERAPY | Facility: HOSPITAL | Age: 62
Setting detail: THERAPIES SERIES
Discharge: HOME | End: 2017-01-25

## 2017-01-25 DIAGNOSIS — Z96.651 HISTORY OF TOTAL KNEE REPLACEMENT, RIGHT: ICD-10-CM

## 2017-01-25 DIAGNOSIS — M25.561 ACUTE PAIN OF RIGHT KNEE: ICD-10-CM

## 2017-01-25 DIAGNOSIS — Z47.89 ORTHOPEDIC AFTERCARE: Primary | ICD-10-CM

## 2017-01-25 PROCEDURE — 97110 THERAPEUTIC EXERCISES: CPT

## 2017-01-25 NOTE — PROGRESS NOTES
Outpatient Physical Therapy Ortho Treatment Note  [facility]     Patient Name: Lisette Larose  : 1955  MRN: [id number]  Today's Date: 2017      Visit Date: 2017    Visit Dx:    ICD-10-CM ICD-9-CM   1. Orthopedic aftercare Z47.89 V54.9   2. Acute pain of right knee M25.561 719.46   3. History of total knee replacement, right Z96.651 V43.65       There is no problem list on file for this patient.       No past medical history on file.     No past surgical history on file.                          PT Assessment/Plan       17 1622 17 1531 17 1515    PT Assessment    Assessment Comments Pt gait demonstrating improved TKE and decreased lateral sway today. Continued to focus on extension and quadriceps strengthening activities today. Pt tolerating all exercises well and has been performing heel prop at home to improve extension. Pt to return home at the end of next week and will be discharged at that point.   -LS Pt greatest limitation is full R knee extension. She is able to improve TKE during gait with cuing but demonstrates decreased extension when observed entering the clinic. Focused on knee extension and hip abductor strengthening today with addition of heel prop and sidelying clamshells and abduction to reduce lateral sway and trendelenberg noted during ambulation. Discussed pt sleeping position and added extension prop to HEP.   -LS Increased weight with LAQ/SAQ to increased quad strength. Romm doing well. Pain is well controlled  -WS    PT Plan    PT Plan Comments Continue R TKR strengthening and ROM program. Focus on extension, stair negotiation, gait mechanics. Prepare for d/c next week.   -LS Continue R TKR strengthening and ROM program; continue to foucs on extension, stair negotiation, and gait.   -LS       User Key  (r) = Recorded By, (t) = Taken By, (c) = Cosigned By    Initials Name Provider Type    CRISTY Martinez PTA Physical Therapy Assistant    JOSELYN Mora  Claudia, [place] Physical Therapist                    Exercises       01/25/17 1600          Subjective Comments    Subjective Comments It is feeling better and now when I lay in bed I can get it to touch the sheet.   -LS      Subjective Pain    Able to rate subjective pain? yes  -LS      Pre-Treatment Pain Level 2  -LS      Exercise 1    Exercise Name 1 see flowsheet  -LS        User Key  (r) = Recorded By, (t) = Taken By, (c) = Cosigned By    Initials Name Provider Type    JOSELYN Taylor PT Physical Therapist                        Manual Rx (last 36 hours)      Manual Treatments       01/25/17 1500          Manual Rx 1    Manual Rx 1 Location AAROM extension with overpressure  -LS      Manual Rx 1 Duration 5 minutes  -LS        User Key  (r) = Recorded By, (t) = Taken By, (c) = Cosigned By    Initials Name Provider Type    JOSELYN Taylor PT Physical Therapist                PT OP Goals       01/25/17 1600 01/23/17 1500 01/20/17 1500    PT Short Term Goals    STG Date to Achieve 01/16/17  -LS 01/16/17  -LS 01/16/17  -WS    STG 1 Pt will demonstrate understanding and compliance with initial HEP.  -LS Pt will demonstrate understanding and compliance with initial HEP.  -LS Pt will demonstrate understanding and compliance with initial HEP.  -WS    STG 1 Progress Met  -LS Met  -LS Met  -WS    STG 2 Pt will demonstrate improved RLE AROM from 5-92 degrees to 3-100 degrees to improve her gait pattern and mechanics.  -LS Pt will demonstrate improved RLE AROM from 5-92 degrees to 3-100 degrees to improve her gait pattern and mechanics.  -LS Pt will demonstrate improved RLE AROM from 5-92 degrees to 3-100 degrees to improve her gait pattern and mechanics.  -WS    STG 2 Progress Partially Met  -LS Partially Met  -LS Partially Met  -WS    STG 3 Pt will demonstrate equal step length and equal heel strike B with least restrictive AD to restore her normal gait mechanics.   -LS Pt will demonstrate equal step length and equal heel  strike B with least restrictive AD to restore her normal gait mechanics.   -LS Pt will demonstrate equal step length and equal heel strike B with least restrictive AD to restore her normal gait mechanics.   -WS    STG 3 Progress Met  -LS Met  -LS Partially Met  -WS    STG 3 Progress Comments  Pt demonstrating equal step length B and heel strike using SC.  -LS     Long Term Goals    LTG Date to Achieve 02/01/17  -LS 02/01/17  -LS 02/01/17  -WS    LTG 1 Pt will demonstrate improved RLE AROM from 5-92 degrees to 0-120 to restore normal motion.  -LS Pt will demonstrate improved RLE AROM from 5-92 degrees to 0-120 to restore normal motion.  -LS Pt will demonstrate improved RLE AROM from 5-92 degrees to 0-120 to restore normal motion.  -WS    LTG 1 Progress Progressing  -LS Progressing  -LS Progressing  -WS    LTG 1 Progress Comments  5-110 degrees  -LS     LTG 2 Pt will demonstrate 5/5 knee flexion and extension strength to allow her to return to her prior level of function.  -LS Pt will demonstrate 5/5 knee flexion and extension strength to allow her to return to her prior level of function.  -LS Pt will demonstrate 5/5 knee flexion and extension strength to allow her to return to her prior level of function.  -WS    LTG 2 Progress Progressing  -LS Progressing  -LS Progressing  -WS    LTG 2 Progress Comments  Pt strength improving and using 4# cuff weights for standing hamstring and quadriceps exercises.  -LS     LTG 3 Pt will demonstrate improved overall function as evidenced by reduced disability score on KOS from 56% to less than or equal to 35% to improve her transition back to independent living in [place], [place].  -LS Pt will demonstrate improved overall function as evidenced by reduced disability score on KOS from 56% to less than or equal to 35% to improve her transition back to independent living in [place], [place].  -LS Pt will demonstrate improved overall function as evidenced by reduced disability score on KOS from  56% to less than or equal to 35% to improve her transition back to independent living in [place], [place].  -WS    LTG 3 Progress Progressing  -LS Progressing  -LS Progressing  -WS      01/18/17 1500 01/13/17 1500       PT Short Term Goals    STG Date to Achieve 01/16/17  -LS 01/16/17  -WS     STG 1 Pt will demonstrate understanding and compliance with initial HEP.  -LS Pt will demonstrate understanding and compliance with initial HEP.  -WS     STG 1 Progress Met  -LS Met  -WS     STG 2 Pt will demonstrate improved RLE AROM from 5-92 degrees to 3-100 degrees to improve her gait pattern and mechanics.  -LS Pt will demonstrate improved RLE AROM from 5-92 degrees to 3-100 degrees to improve her gait pattern and mechanics.  -WS     STG 2 Progress Partially Met  -LS Partially Met  -WS     STG 2 Progress Comments 5-116  -LS      STG 3 Pt will demonstrate equal step length and equal heel strike B with least restrictive AD to restore her normal gait mechanics.   -LS Pt will demonstrate equal step length and equal heel strike B with least restrictive AD to restore her normal gait mechanics.   -WS     STG 3 Progress Partially Met  -LS Ongoing  -WS     STG 3 Progress Comments Pt ambulating well with straight cane; continues to demonstrate antalgic gait pattern without AD.  -LS      Long Term Goals    LTG Date to Achieve 02/01/17  -LS 02/01/17  -WS     LTG 1 Pt will demonstrate improved RLE AROM from 5-92 degrees to 0-120 to restore normal motion.  -LS Pt will demonstrate improved RLE AROM from 5-92 degrees to 0-120 to restore normal motion.  -WS     LTG 1 Progress Progressing  -LS Ongoing  -WS     LTG 2 Pt will demonstrate 5/5 knee flexion and extension strength to allow her to return to her prior level of function.  -LS Pt will demonstrate 5/5 knee flexion and extension strength to allow her to return to her prior level of function.  -WS     LTG 2 Progress Progressing  -LS Ongoing  -WS     LTG 3 Pt will demonstrate improved overall  function as evidenced by reduced disability score on KOS from 56% to less than or equal to 35% to improve her transition back to independent living in [place].  -LS Pt will demonstrate improved overall function as evidenced by reduced disability score on KOS from 56% to less than or equal to 35% to improve her transition back to independent living in [place], [place].  -WS     LTG 3 Progress Progressing  -LS Ongoing  -WS       User Key  (r) = Recorded By, (t) = Taken By, (c) = Cosigned By    Initials Name Provider Type    CRISTY Martinez PTA Physical Therapy Assistant    JOSELYN Taylor PT Physical Therapist                Therapy Education       01/25/17 1621    Therapy Education    Given HEP;Symptoms/condition management  -LS    Program Reinforced  -LS    How Provided Verbal  -LS    Provided to Patient  -LS    Level of Understanding Teach back education performed;Verbalized;Demonstrated  -LS      01/20/17 1515    Therapy Education    Given HEP;Symptoms/condition management;Pain management  -WS    Program Reinforced  -WS    How Provided Verbal  -WS    Provided to Patient  -WS      User Key  (r) = Recorded By, (t) = Taken By, (c) = Cosigned By    Initials Name Provider Type    CRISTY Martinez PTA Physical Therapy Assistant    JOSELYN Taylor PT Physical Therapist                Time Calculation:   Start Time: 1445  Stop Time: 1530  Time Calculation (min): 45 min    Therapy Charges for Today     Code Description Service Date Service Provider Modifiers Qty    16936626880 HC PT THER PROC EA 15 MIN 1/25/2017 Abby Taylor PT GP 3    40403424084 HC PT HOT OR COLD PACK TREAT MCARE 1/25/2017 Abby Taylor, [place] GP 1                    Abby Taylor PT  1/25/2017

## 2017-01-27 ENCOUNTER — APPOINTMENT (OUTPATIENT)
Dept: PHYSICAL THERAPY | Facility: HOSPITAL | Age: 62
End: 2017-01-27

## 2017-01-30 ENCOUNTER — HOSPITAL ENCOUNTER (OUTPATIENT)
Dept: PHYSICAL THERAPY | Facility: HOSPITAL | Age: 62
Setting detail: THERAPIES SERIES
Discharge: HOME | End: 2017-01-30

## 2017-01-30 DIAGNOSIS — Z47.89 ORTHOPEDIC AFTERCARE: Primary | ICD-10-CM

## 2017-01-30 DIAGNOSIS — Z96.651 HISTORY OF TOTAL KNEE REPLACEMENT, RIGHT: ICD-10-CM

## 2017-01-30 DIAGNOSIS — M25.561 ACUTE PAIN OF RIGHT KNEE: ICD-10-CM

## 2017-01-30 PROCEDURE — 97110 THERAPEUTIC EXERCISES: CPT

## 2017-01-30 NOTE — PROGRESS NOTES
Outpatient Physical Therapy Ortho Treatment Note  [facility]     Patient Name: Lisette Larose  : 1955  MRN: [id number]  Today's Date: 2017      Visit Date: 2017    Visit Dx:    ICD-10-CM ICD-9-CM   1. Orthopedic aftercare Z47.89 V54.9   2. Acute pain of right knee M25.561 719.46   3. History of total knee replacement, right Z96.651 V43.65       There is no problem list on file for this patient.       No past medical history on file.     No past surgical history on file.                          PT Assessment/Plan       17 1356 17 1622 17 1531    PT Assessment    Assessment Comments Patient is independent with progressive home exercise program and moving back to [facility]. Updated HEP printed and discussed. Patient discharged at this time.   -WS Pt gait demonstrating improved TKE and decreased lateral sway today. Continued to focus on extension and quadriceps strengthening activities today. Pt tolerating all exercises well and has been performing heel prop at home to improve extension. Pt to return home at the end of next week and will be discharged at that point.   -LS Pt greatest limitation is full R knee extension. She is able to improve TKE during gait with cuing but demonstrates decreased extension when observed entering the clinic. Focused on knee extension and hip abductor strengthening today with addition of heel prop and sidelying clamshells and abduction to reduce lateral sway and trendelenberg noted during ambulation. Discussed pt sleeping position and added extension prop to HEP.   -LS    PT Plan    PT Plan Comments  Continue R TKR strengthening and ROM program. Focus on extension, stair negotiation, gait mechanics. Prepare for d/c next week.   -LS Continue R TKR strengthening and ROM program; continue to foucs on extension, stair negotiation, and gait.   -LS      User Key  (r) = Recorded By, (t) = Taken By, (c) = Cosigned By    Initials Name Provider Type    CRISTY  Murtaza Martinez PTA Physical Therapy Assistant    LS Abby Taylor, PT Physical Therapist                Modalities       01/30/17 1315          Ice    Ice Applied Yes  -WS      Location R knee over bolster  -WS      Rx Minutes 10 mins  -WS      Ice S/P Rx Yes  -WS        User Key  (r) = Recorded By, (t) = Taken By, (c) = Cosigned By    Initials Name Provider Type    CRISTY Martinez PTA Physical Therapy Assistant                Exercises       01/30/17 1315          Subjective Comments    Subjective Comments Today is the last day. Going back to [place]. Knee is stiff today  -WS      Subjective Pain    Able to rate subjective pain? yes  -WS      Pre-Treatment Pain Level 2  -WS      Exercise 1    Exercise Name 1 see flowsheet  -WS        User Key  (r) = Recorded By, (t) = Taken By, (c) = Cosigned By    Initials Name Provider Type    WS Murtaza Martinez PTA Physical Therapy Assistant                               PT OP Goals       01/30/17 1300 01/25/17 1600 01/23/17 1500    PT Short Term Goals    STG Date to Achieve 01/16/17  -WS 01/16/17  -LS 01/16/17  -LS    STG 1 Pt will demonstrate understanding and compliance with initial HEP.  -WS Pt will demonstrate understanding and compliance with initial HEP.  -LS Pt will demonstrate understanding and compliance with initial HEP.  -LS    STG 1 Progress Met  -WS Met  -LS Met  -LS    STG 2 Pt will demonstrate improved RLE AROM from 5-92 degrees to 3-100 degrees to improve her gait pattern and mechanics.  -WS Pt will demonstrate improved RLE AROM from 5-92 degrees to 3-100 degrees to improve her gait pattern and mechanics.  -LS Pt will demonstrate improved RLE AROM from 5-92 degrees to 3-100 degrees to improve her gait pattern and mechanics.  -LS    STG 2 Progress Partially Met  -WS Partially Met  -LS Partially Met  -LS    STG 3 Pt will demonstrate equal step length and equal heel strike B with least restrictive AD to restore her normal gait mechanics.   -WS Pt will  demonstrate equal step length and equal heel strike B with least restrictive AD to restore her normal gait mechanics.   -LS Pt will demonstrate equal step length and equal heel strike B with least restrictive AD to restore her normal gait mechanics.   -LS    STG 3 Progress Met  -WS Met  -LS Met  -LS    STG 3 Progress Comments   Pt demonstrating equal step length B and heel strike using SC.  -LS    Long Term Goals    LTG Date to Achieve 02/01/17  -WS 02/01/17  -LS 02/01/17  -LS    LTG 1 Pt will demonstrate improved RLE AROM from 5-92 degrees to 0-120 to restore normal motion.  -WS Pt will demonstrate improved RLE AROM from 5-92 degrees to 0-120 to restore normal motion.  -LS Pt will demonstrate improved RLE AROM from 5-92 degrees to 0-120 to restore normal motion.  -LS    LTG 1 Progress Partially Met  -WS Progressing  -LS Progressing  -LS    LTG 1 Progress Comments   5-110 degrees  -LS    LTG 2 Pt will demonstrate 5/5 knee flexion and extension strength to allow her to return to her prior level of function.  -WS Pt will demonstrate 5/5 knee flexion and extension strength to allow her to return to her prior level of function.  -LS Pt will demonstrate 5/5 knee flexion and extension strength to allow her to return to her prior level of function.  -LS    LTG 2 Progress Partially Met  -WS Progressing  -LS Progressing  -LS    LTG 2 Progress Comments   Pt strength improving and using 4# cuff weights for standing hamstring and quadriceps exercises.  -LS    LTG 3 Pt will demonstrate improved overall function as evidenced by reduced disability score on KOS from 56% to less than or equal to 35% to improve her transition back to independent living in [place], [place].  -WS Pt will demonstrate improved overall function as evidenced by reduced disability score on KOS from 56% to less than or equal to 35% to improve her transition back to independent living in [place], [place].  -LS Pt will demonstrate improved overall function as evidenced by  reduced disability score on KOS from 56% to less than or equal to 35% to improve her transition back to independent living in [place], [place].  -LS    LTG 3 Progress Met  -WS Progressing  -LS Progressing  -LS      01/20/17 1500 01/18/17 1500       PT Short Term Goals    STG Date to Achieve 01/16/17  -WS 01/16/17  -LS     STG 1 Pt will demonstrate understanding and compliance with initial HEP.  -WS Pt will demonstrate understanding and compliance with initial HEP.  -LS     STG 1 Progress Met  -WS Met  -LS     STG 2 Pt will demonstrate improved RLE AROM from 5-92 degrees to 3-100 degrees to improve her gait pattern and mechanics.  -WS Pt will demonstrate improved RLE AROM from 5-92 degrees to 3-100 degrees to improve her gait pattern and mechanics.  -LS     STG 2 Progress Partially Met  -WS Partially Met  -LS     STG 2 Progress Comments  5-116  -LS     STG 3 Pt will demonstrate equal step length and equal heel strike B with least restrictive AD to restore her normal gait mechanics.   -WS Pt will demonstrate equal step length and equal heel strike B with least restrictive AD to restore her normal gait mechanics.   -LS     STG 3 Progress Partially Met  -WS Partially Met  -LS     STG 3 Progress Comments  Pt ambulating well with straight cane; continues to demonstrate antalgic gait pattern without AD.  -LS     Long Term Goals    LTG Date to Achieve 02/01/17  -WS 02/01/17  -LS     LTG 1 Pt will demonstrate improved RLE AROM from 5-92 degrees to 0-120 to restore normal motion.  -WS Pt will demonstrate improved RLE AROM from 5-92 degrees to 0-120 to restore normal motion.  -LS     LTG 1 Progress Progressing  -WS Progressing  -LS     LTG 2 Pt will demonstrate 5/5 knee flexion and extension strength to allow her to return to her prior level of function.  -WS Pt will demonstrate 5/5 knee flexion and extension strength to allow her to return to her prior level of function.  -LS     LTG 2 Progress Progressing  -WS Progressing  -LS      LTG 3 Pt will demonstrate improved overall function as evidenced by reduced disability score on KOS from 56% to less than or equal to 35% to improve her transition back to independent living in [place], [place].  -WS Pt will demonstrate improved overall function as evidenced by reduced disability score on KOS from 56% to less than or equal to 35% to improve her transition back to independent living in [place], [place].  -LS     LTG 3 Progress Progressing  -WS Progressing  -LS       User Key  (r) = Recorded By, (t) = Taken By, (c) = Cosigned By    Initials Name Provider Type    CRISTY Martinez PTA Physical Therapy Assistant    JOSELYN Taylor, PT Physical Therapist                Therapy Education       01/30/17 1349    Therapy Education    Given HEP;Symptoms/condition management  -WS    Program Reinforced  -WS    How Provided Verbal  -WS    Provided to Patient  -WS    Level of Understanding Teach back education performed  -WS      01/30/17 1319    Therapy Education    Given HEP;Symptoms/condition management;Pain management;Edema management;Mobility training;Posture/body mechanics  -WS    Program Reinforced  -WS    How Provided Verbal  -WS    Provided to Patient  -WS      01/25/17 1621    Therapy Education    Given HEP;Symptoms/condition management  -LS    Program Reinforced  -LS    How Provided Verbal  -LS    Provided to Patient  -LS    Level of Understanding Teach back education performed;Verbalized;Demonstrated  -LS      User Key  (r) = Recorded By, (t) = Taken By, (c) = Cosigned By    Initials Name Provider Type    CRISTY Martinez PTA Physical Therapy Assistant    JOSELYN Taylor, PT Physical Therapist                Time Calculation:   Start Time: 1315  Stop Time: 1400  Time Calculation (min): 45 min    Therapy Charges for Today     Code Description Service Date Service Provider Modifiers Qty    14768079956 HC PT THER PROC EA 15 MIN 1/30/2017 Murtaza Martinez PTA GP 2    24745692368 HC PT HOT OR COLD PACK TREAT  JOSSUE 1/30/2017 Murtaza Martinez, PTA GP 1                    Murtaza Martinez, PTA  1/30/2017

## 2017-02-01 ENCOUNTER — APPOINTMENT (OUTPATIENT)
Dept: PHYSICAL THERAPY | Facility: HOSPITAL | Age: 62
End: 2017-02-01

## 2017-02-03 ENCOUNTER — APPOINTMENT (OUTPATIENT)
Dept: PHYSICAL THERAPY | Facility: HOSPITAL | Age: 62
End: 2017-02-03

## 2017-02-10 ENCOUNTER — DOCUMENTATION (OUTPATIENT)
Dept: PHYSICAL THERAPY | Facility: HOSPITAL | Age: 62
End: 2017-02-10

## 2017-02-10 NOTE — THERAPY DISCHARGE NOTE
Outpatient Physical Therapy Ortho Treatment Note/Discharge Summary       Patient Name: Lisette Larose  : 1955  MRN: [id number]  Today's Date: 2/10/2017      Visit Date: 02/10/2017    Visit Dx:  No diagnosis found.    There is no problem list on file for this patient.       No past medical history on file.     No past surgical history on file.                                                     PT OP Goals       02/10/17 0900          PT Short Term Goals    STG Date to Achieve 17  -      STG 1 Pt will demonstrate understanding and compliance with initial HEP.  -WS      STG 1 Progress Met  -      STG 2 Pt will demonstrate improved RLE AROM from 5-92 degrees to 3-100 degrees to improve her gait pattern and mechanics.  -WS      STG 2 Progress Partially Met  -WS      STG 3 Pt will demonstrate equal step length and equal heel strike B with least restrictive AD to restore her normal gait mechanics.   -      STG 3 Progress Met  -      Long Term Goals    LTG Date to Achieve 17  -      LTG 1 Pt will demonstrate improved RLE AROM from 5-92 degrees to 0-120 to restore normal motion.  -WS      LTG 1 Progress Partially Met  -      LTG 1 Progress Comments AROM right knee at d/c 5-116  -      LTG 2 Pt will demonstrate 5/5 knee flexion and extension strength to allow her to return to her prior level of function.  -      LTG 2 Progress Met  -      LTG 3 Pt will demonstrate improved overall function as evidenced by reduced disability score on KOS from 56% to less than or equal to 35% to improve her transition back to independent living in [place], [place].  -      LTG 3 Progress Met  -        User Key  (r) = Recorded By, (t) = Taken By, (c) = Cosigned By    Initials Name Provider Type    CRISTY Martinez PTA Physical Therapy Assistant                    Time Calculation:                  OP PT Discharge Summary  Date of Discharge: 02/10/17  Outcomes Achieved: Patient able to partially acheive  established goals  Discharge Instructions: Patient was seen for 10 visits from 1/2/17 to 1/30/17 consisting of therapeutic exercise, gait training, manual therapy and ice s/p TKA. Patient was last seen 1/30/17 and was ambulating with no AD, AROM -5-116. Patient was discharged to return home to continue work on rom/strength. Updated HEP printed off and discussed.       Murtaza Martinez, PTA  2/10/2017